# Patient Record
Sex: MALE | URBAN - NONMETROPOLITAN AREA
[De-identification: names, ages, dates, MRNs, and addresses within clinical notes are randomized per-mention and may not be internally consistent; named-entity substitution may affect disease eponyms.]

---

## 2017-04-25 ENCOUNTER — APPOINTMENT (RX ONLY)
Dept: URBAN - NONMETROPOLITAN AREA CLINIC 1 | Facility: CLINIC | Age: 67
Setting detail: DERMATOLOGY
End: 2017-04-25

## 2017-04-25 DIAGNOSIS — L81.4 OTHER MELANIN HYPERPIGMENTATION: ICD-10-CM

## 2017-04-25 DIAGNOSIS — L82.1 OTHER SEBORRHEIC KERATOSIS: ICD-10-CM

## 2017-04-25 DIAGNOSIS — L57.0 ACTINIC KERATOSIS: ICD-10-CM

## 2017-04-25 DIAGNOSIS — D485 NEOPLASM OF UNCERTAIN BEHAVIOR OF SKIN: ICD-10-CM

## 2017-04-25 PROBLEM — L70.0 ACNE VULGARIS: Status: ACTIVE | Noted: 2017-04-25

## 2017-04-25 PROBLEM — D48.5 NEOPLASM OF UNCERTAIN BEHAVIOR OF SKIN: Status: ACTIVE | Noted: 2017-04-25

## 2017-04-25 PROBLEM — L85.3 XEROSIS CUTIS: Status: ACTIVE | Noted: 2017-04-25

## 2017-04-25 PROBLEM — G40.909 EPILEPSY, UNSPECIFIED, NOT INTRACTABLE, WITHOUT STATUS EPILEPTICUS: Status: ACTIVE | Noted: 2017-04-25

## 2017-04-25 PROCEDURE — ? DEFER

## 2017-04-25 PROCEDURE — 17000 DESTRUCT PREMALG LESION: CPT

## 2017-04-25 PROCEDURE — ? COUNSELING

## 2017-04-25 PROCEDURE — ? BIOPSY BY SHAVE METHOD

## 2017-04-25 PROCEDURE — 11100: CPT | Mod: 59

## 2017-04-25 PROCEDURE — ? OBSERVATION

## 2017-04-25 PROCEDURE — 11101: CPT

## 2017-04-25 PROCEDURE — ? LIQUID NITROGEN

## 2017-04-25 PROCEDURE — 99203 OFFICE O/P NEW LOW 30 MIN: CPT | Mod: 25

## 2017-04-25 ASSESSMENT — LOCATION DETAILED DESCRIPTION DERM
LOCATION DETAILED: RIGHT POSTERIOR SHOULDER
LOCATION DETAILED: RIGHT SUPERIOR UPPER BACK
LOCATION DETAILED: LEFT SUPERIOR UPPER BACK
LOCATION DETAILED: RIGHT MID-UPPER BACK
LOCATION DETAILED: LEFT SUPERIOR HELIX

## 2017-04-25 ASSESSMENT — LOCATION SIMPLE DESCRIPTION DERM
LOCATION SIMPLE: RIGHT UPPER BACK
LOCATION SIMPLE: LEFT EAR
LOCATION SIMPLE: LEFT UPPER BACK
LOCATION SIMPLE: RIGHT SHOULDER

## 2017-04-25 ASSESSMENT — LOCATION ZONE DERM
LOCATION ZONE: ARM
LOCATION ZONE: EAR
LOCATION ZONE: TRUNK

## 2017-04-25 NOTE — PROCEDURE: LIQUID NITROGEN
Consent: The patient's consent was obtained including but not limited to risks of crusting, scabbing, blistering, scarring, darker or lighter pigmentary change, recurrence, incomplete removal and infection.
Detail Level: Simple
Duration Of Freeze Thaw-Cycle (Seconds): 8
Number Of Freeze-Thaw Cycles: 1 freeze-thaw cycle
Post-Care Instructions: I reviewed with the patient in detail post-care instructions. Patient is to wear sunprotection, and avoid picking at any of the treated lesions. Pt may apply Vaseline to crusted or scabbing areas.
Render Post-Care Instructions In Note?: no

## 2017-04-25 NOTE — PROCEDURE: BIOPSY BY SHAVE METHOD
Bill 99665 For Specimen Handling/Conveyance To Laboratory?: no
Biopsy Method: Jaquelin cabezas
X Size Of Lesion In Cm: 0
Notification Instructions: Patient will be notified of biopsy results. However, patient instructed to call the office if not contacted within 2 weeks.
Anesthesia Volume In Cc: 0.5
Post-Care Instructions: I reviewed with the patient in detail post-care instructions. Patient is to keep the biopsy site dry overnight, and then apply bacitracin twice daily until healed. Patient may apply hydrogen peroxide soaks to remove any crusting.
Dressing: bandage
Size Of Lesion In Cm: 6
Anesthesia Type: 1% lidocaine with epinephrine
Biopsy Type: H and E
Consent: Written consent was obtained and risks were reviewed including but not limited to scarring, infection, bleeding, scabbing, incomplete removal, nerve damage and allergy to anesthesia.
Hemostasis: Drysol
Lab: [id number]
Wound Care: Bacitracin
Type Of Destruction Used: Curettage
Billing Type: Third-Party Bill
Detail Level: Detailed
Size Of Lesion In Cm: 0.3

## 2019-10-25 ENCOUNTER — APPOINTMENT (RX ONLY)
Dept: URBAN - NONMETROPOLITAN AREA CLINIC 1 | Facility: CLINIC | Age: 69
Setting detail: DERMATOLOGY
End: 2019-10-25

## 2019-10-25 DIAGNOSIS — B88.0 OTHER ACARIASIS: ICD-10-CM

## 2019-10-25 DIAGNOSIS — L82.1 OTHER SEBORRHEIC KERATOSIS: ICD-10-CM

## 2019-10-25 DIAGNOSIS — L57.0 ACTINIC KERATOSIS: ICD-10-CM

## 2019-10-25 DIAGNOSIS — Z12.83 ENCOUNTER FOR SCREENING FOR MALIGNANT NEOPLASM OF SKIN: ICD-10-CM

## 2019-10-25 PROCEDURE — 17000 DESTRUCT PREMALG LESION: CPT

## 2019-10-25 PROCEDURE — 99214 OFFICE O/P EST MOD 30 MIN: CPT | Mod: 25

## 2019-10-25 PROCEDURE — ? COUNSELING

## 2019-10-25 PROCEDURE — ? PRESCRIPTION

## 2019-10-25 PROCEDURE — ? LIQUID NITROGEN

## 2019-10-25 PROCEDURE — 17003 DESTRUCT PREMALG LES 2-14: CPT

## 2019-10-25 RX ORDER — IVERMECTIN 10 MG/G
CREAM TOPICAL
Qty: 1 | Refills: 3 | Status: ERX | COMMUNITY
Start: 2019-10-25

## 2019-10-25 RX ADMIN — IVERMECTIN 1: 10 CREAM TOPICAL at 00:00

## 2019-10-25 ASSESSMENT — LOCATION SIMPLE DESCRIPTION DERM
LOCATION SIMPLE: CHEST
LOCATION SIMPLE: RIGHT HAND
LOCATION SIMPLE: RIGHT MIDDLE FINGER
LOCATION SIMPLE: RIGHT UPPER BACK
LOCATION SIMPLE: LEFT WRIST
LOCATION SIMPLE: RIGHT EYEBROW
LOCATION SIMPLE: LEFT EYEBROW
LOCATION SIMPLE: RIGHT EAR
LOCATION SIMPLE: LEFT HAND

## 2019-10-25 ASSESSMENT — LOCATION ZONE DERM
LOCATION ZONE: EAR
LOCATION ZONE: FACE
LOCATION ZONE: FINGER
LOCATION ZONE: TRUNK
LOCATION ZONE: ARM
LOCATION ZONE: HAND

## 2019-10-25 ASSESSMENT — LOCATION DETAILED DESCRIPTION DERM
LOCATION DETAILED: RIGHT CENTRAL EYEBROW
LOCATION DETAILED: RIGHT ANTIHELIX
LOCATION DETAILED: LEFT ULNAR DORSAL HAND
LOCATION DETAILED: LEFT DORSAL WRIST
LOCATION DETAILED: LEFT MEDIAL SUPERIOR CHEST
LOCATION DETAILED: RIGHT ULNAR DORSAL HAND
LOCATION DETAILED: LEFT CENTRAL EYEBROW
LOCATION DETAILED: RIGHT SUPERIOR MEDIAL UPPER BACK
LOCATION DETAILED: RIGHT CYMBA CONCHA
LOCATION DETAILED: RIGHT PROXIMAL DORSAL MIDDLE FINGER

## 2019-10-25 NOTE — PROCEDURE: LIQUID NITROGEN
Render Note In Bullet Format When Appropriate: No
Duration Of Freeze Thaw-Cycle (Seconds): 0
Post-Care Instructions: I reviewed with the patient in detail post-care instructions. Patient is to wear sunprotection, and avoid picking at any of the treated lesions. Pt may apply Vaseline to crusted or scabbing areas.
Number Of Freeze-Thaw Cycles: 2 freeze-thaw cycles
Detail Level: Simple
Consent: The patient's consent was obtained including but not limited to risks of crusting, scabbing, blistering, scarring, darker or lighter pigmentary change, recurrence, incomplete removal and infection.

## 2019-10-25 NOTE — HPI: ITCHING
How Did Your Itching Occur?: sudden in onset (over a period of weeks to a few months)
How Severe Is Your Itching?: mild
Additional History: Patient has itching just in his eyebrows.  He feels a \"crawling sensation\", but denies rash.

## 2020-10-14 ENCOUNTER — APPOINTMENT (RX ONLY)
Dept: URBAN - NONMETROPOLITAN AREA CLINIC 1 | Facility: CLINIC | Age: 70
Setting detail: DERMATOLOGY
End: 2020-10-14

## 2020-10-14 VITALS — TEMPERATURE: 97.7 F

## 2020-10-14 DIAGNOSIS — L82.1 OTHER SEBORRHEIC KERATOSIS: ICD-10-CM

## 2020-10-14 DIAGNOSIS — Z85.828 PERSONAL HISTORY OF OTHER MALIGNANT NEOPLASM OF SKIN: ICD-10-CM

## 2020-10-14 DIAGNOSIS — L72.8 OTHER FOLLICULAR CYSTS OF THE SKIN AND SUBCUTANEOUS TISSUE: ICD-10-CM

## 2020-10-14 DIAGNOSIS — Z12.83 ENCOUNTER FOR SCREENING FOR MALIGNANT NEOPLASM OF SKIN: ICD-10-CM

## 2020-10-14 PROCEDURE — ? VITAMIN B3 COUNSELING

## 2020-10-14 PROCEDURE — 99214 OFFICE O/P EST MOD 30 MIN: CPT

## 2020-10-14 PROCEDURE — ? COUNSELING

## 2020-10-14 ASSESSMENT — LOCATION ZONE DERM
LOCATION ZONE: SCALP
LOCATION ZONE: TRUNK
LOCATION ZONE: EYELID
LOCATION ZONE: LEG

## 2020-10-14 ASSESSMENT — LOCATION SIMPLE DESCRIPTION DERM
LOCATION SIMPLE: LEFT UPPER BACK
LOCATION SIMPLE: SCALP
LOCATION SIMPLE: LEFT PRETIBIAL REGION
LOCATION SIMPLE: RIGHT UPPER BACK
LOCATION SIMPLE: LEFT INFERIOR EYELID

## 2020-10-14 ASSESSMENT — LOCATION DETAILED DESCRIPTION DERM
LOCATION DETAILED: RIGHT SUPERIOR MEDIAL UPPER BACK
LOCATION DETAILED: LEFT CENTRAL POSTAURICULAR SKIN
LOCATION DETAILED: LEFT PROXIMAL PRETIBIAL REGION
LOCATION DETAILED: LEFT INFERIOR LID MARGIN
LOCATION DETAILED: LEFT MEDIAL UPPER BACK

## 2020-10-14 NOTE — PROCEDURE: VITAMIN B3 COUNSELING
Detail Level: Detailed
Counseling Text: I recommended taking nicotinamide or niacinamide, also know as vitamin B3, twice daily. Recent evidence suggests that taking vitamin B3 (500 mg twice daily) can reduce the risk of actinic keratoses and non-melanoma skin cancers. Side effects of vitamin B3 include flushing and headache. Please check with your primary care provider to see if there any reasons not to take this supplement.

## 2020-10-14 NOTE — PROCEDURE: COUNSELING
Detail Level: Generalized
Detail Level: Detailed
Patient Specific Counseling (Will Not Stick From Patient To Patient): Advised patient have this removed by his ophthalmologist if it is bothersome since it is inside the lash line.
Detail Level: Zone